# Patient Record
Sex: MALE | URBAN - METROPOLITAN AREA
[De-identification: names, ages, dates, MRNs, and addresses within clinical notes are randomized per-mention and may not be internally consistent; named-entity substitution may affect disease eponyms.]

---

## 2024-06-12 ENCOUNTER — APPOINTMENT (RX ONLY)
Dept: URBAN - METROPOLITAN AREA CLINIC 148 | Facility: CLINIC | Age: 44
Setting detail: DERMATOLOGY
End: 2024-06-12

## 2024-06-12 DIAGNOSIS — D22 MELANOCYTIC NEVI: ICD-10-CM

## 2024-06-12 DIAGNOSIS — D18.0 HEMANGIOMA: ICD-10-CM

## 2024-06-12 DIAGNOSIS — L91.8 OTHER HYPERTROPHIC DISORDERS OF THE SKIN: ICD-10-CM

## 2024-06-12 PROBLEM — D18.01 HEMANGIOMA OF SKIN AND SUBCUTANEOUS TISSUE: Status: ACTIVE | Noted: 2024-06-12

## 2024-06-12 PROBLEM — D22.39 MELANOCYTIC NEVI OF OTHER PARTS OF FACE: Status: ACTIVE | Noted: 2024-06-12

## 2024-06-12 PROCEDURE — ? COUNSELING

## 2024-06-12 PROCEDURE — 99203 OFFICE O/P NEW LOW 30 MIN: CPT

## 2024-06-12 PROCEDURE — ? OBSERVATION

## 2024-06-12 ASSESSMENT — LOCATION SIMPLE DESCRIPTION DERM
LOCATION SIMPLE: RIGHT ANTERIOR NECK
LOCATION SIMPLE: SCALP
LOCATION SIMPLE: LEFT FOREHEAD
LOCATION SIMPLE: INFERIOR FOREHEAD
LOCATION SIMPLE: LEFT ANTERIOR NECK

## 2024-06-12 ASSESSMENT — LOCATION DETAILED DESCRIPTION DERM
LOCATION DETAILED: RIGHT CLAVICULAR NECK
LOCATION DETAILED: LEFT CLAVICULAR NECK
LOCATION DETAILED: LEFT SUPERIOR PARIETAL SCALP
LOCATION DETAILED: LEFT FOREHEAD
LOCATION DETAILED: INFERIOR MID FOREHEAD

## 2024-06-12 ASSESSMENT — LOCATION ZONE DERM
LOCATION ZONE: NECK
LOCATION ZONE: SCALP
LOCATION ZONE: FACE

## 2025-05-14 ENCOUNTER — APPOINTMENT (OUTPATIENT)
Dept: URBAN - METROPOLITAN AREA CLINIC 148 | Facility: CLINIC | Age: 45
Setting detail: DERMATOLOGY
End: 2025-05-14

## 2025-05-14 DIAGNOSIS — L90.5 SCAR CONDITIONS AND FIBROSIS OF SKIN: ICD-10-CM

## 2025-05-14 DIAGNOSIS — D22 MELANOCYTIC NEVI: ICD-10-CM

## 2025-05-14 DIAGNOSIS — L85.8 OTHER SPECIFIED EPIDERMAL THICKENING: ICD-10-CM

## 2025-05-14 PROBLEM — D22.72 MELANOCYTIC NEVI OF LEFT LOWER LIMB, INCLUDING HIP: Status: ACTIVE | Noted: 2025-05-14

## 2025-05-14 PROBLEM — D22.39 MELANOCYTIC NEVI OF OTHER PARTS OF FACE: Status: ACTIVE | Noted: 2025-05-14

## 2025-05-14 PROBLEM — D22.71 MELANOCYTIC NEVI OF RIGHT LOWER LIMB, INCLUDING HIP: Status: ACTIVE | Noted: 2025-05-14

## 2025-05-14 PROCEDURE — ? OTHER

## 2025-05-14 PROCEDURE — ? OBSERVATION

## 2025-05-14 PROCEDURE — 99213 OFFICE O/P EST LOW 20 MIN: CPT

## 2025-05-14 PROCEDURE — ? COUNSELING

## 2025-05-14 ASSESSMENT — LOCATION DETAILED DESCRIPTION DERM
LOCATION DETAILED: RIGHT ANTERIOR PROXIMAL THIGH
LOCATION DETAILED: INFERIOR MID FOREHEAD
LOCATION DETAILED: RIGHT POSTERIOR ANKLE
LOCATION DETAILED: RIGHT PROXIMAL POSTERIOR THIGH
LOCATION DETAILED: LEFT MEDIAL DISTAL PRETIBIAL REGION
LOCATION DETAILED: LEFT ANTERIOR PROXIMAL THIGH
LOCATION DETAILED: LEFT PROXIMAL POSTERIOR THIGH

## 2025-05-14 ASSESSMENT — LOCATION SIMPLE DESCRIPTION DERM
LOCATION SIMPLE: LEFT PRETIBIAL REGION
LOCATION SIMPLE: LEFT POSTERIOR THIGH
LOCATION SIMPLE: RIGHT ANKLE
LOCATION SIMPLE: RIGHT THIGH
LOCATION SIMPLE: LEFT THIGH
LOCATION SIMPLE: INFERIOR FOREHEAD
LOCATION SIMPLE: RIGHT POSTERIOR THIGH

## 2025-05-14 ASSESSMENT — LOCATION ZONE DERM
LOCATION ZONE: FACE
LOCATION ZONE: LEG

## 2025-05-14 NOTE — PROCEDURE: OTHER
Detail Level: Zone
Render Risk Assessment In Note?: no
Note Text (......Xxx Chief Complaint.): This diagnosis correlates with the
Other (Free Text): [id number]%/ SA